# Patient Record
Sex: FEMALE | URBAN - METROPOLITAN AREA
[De-identification: names, ages, dates, MRNs, and addresses within clinical notes are randomized per-mention and may not be internally consistent; named-entity substitution may affect disease eponyms.]

---

## 2021-09-21 ENCOUNTER — OFFICE VISIT (OUTPATIENT)
Dept: URBAN - METROPOLITAN AREA CLINIC 5 | Facility: CLINIC | Age: 46
End: 2021-09-21
Payer: MEDICAID

## 2021-09-21 DIAGNOSIS — H16.223 KERATOCONJUNCTIVITIS SICCA, BILATERAL: Primary | ICD-10-CM

## 2021-09-21 DIAGNOSIS — H11.153 PINGUECULA, BILATERAL: ICD-10-CM

## 2021-09-21 PROCEDURE — 92012 INTRM OPH EXAM EST PATIENT: CPT | Performed by: OPTOMETRIST

## 2021-09-21 RX ORDER — CYCLOSPORINE 0.5 MG/ML
0.05 % EMULSION OPHTHALMIC
Qty: 180 | Refills: 2 | Status: ACTIVE
Start: 2021-09-21

## 2021-09-21 ASSESSMENT — INTRAOCULAR PRESSURE
OS: 21
OD: 17

## 2021-09-21 NOTE — IMPRESSION/PLAN
Impression: Pinguecula, bilateral: H11.153. Plan: Continue with frequent lubrication of the ocular surface. May continue FML PRN on the bad days.

## 2021-09-21 NOTE — IMPRESSION/PLAN
Impression: Keratoconjunctivitis sicca, bilateral: W34.533. Plan: No improvement with artificial tears. Start Restasis BID OU.

## 2023-12-12 ENCOUNTER — OFFICE VISIT (OUTPATIENT)
Dept: URBAN - METROPOLITAN AREA CLINIC 5 | Facility: CLINIC | Age: 48
End: 2023-12-12
Payer: MEDICAID

## 2023-12-12 DIAGNOSIS — H11.153 PINGUECULA, BILATERAL: ICD-10-CM

## 2023-12-12 DIAGNOSIS — H02.88A MEIBOMIAN GLAND DYSFUNCTION OF RIGHT EYE, UPPER AND LOWER EYELIDS: Primary | ICD-10-CM

## 2023-12-12 PROCEDURE — 92014 COMPRE OPH EXAM EST PT 1/>: CPT | Performed by: OPTOMETRIST

## 2023-12-12 RX ORDER — FLUOROMETHOLONE 1 MG/ML
0.1 % SUSPENSION/ DROPS OPHTHALMIC
Qty: 10 | Refills: 0 | Status: INACTIVE
Start: 2023-12-12 | End: 2024-01-22

## 2023-12-12 ASSESSMENT — INTRAOCULAR PRESSURE
OD: 17
OS: 23